# Patient Record
Sex: FEMALE | Race: WHITE | Employment: UNEMPLOYED | URBAN - NONMETROPOLITAN AREA
[De-identification: names, ages, dates, MRNs, and addresses within clinical notes are randomized per-mention and may not be internally consistent; named-entity substitution may affect disease eponyms.]

---
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
## 2021-02-18 ENCOUNTER — INITIAL CONSULT (OUTPATIENT)
Dept: PULMONOLOGY | Age: 51
End: 2021-02-18
Payer: MEDICARE

## 2021-02-18 VITALS
DIASTOLIC BLOOD PRESSURE: 72 MMHG | WEIGHT: 233.4 LBS | TEMPERATURE: 97.5 F | BODY MASS INDEX: 41.36 KG/M2 | HEIGHT: 63 IN | SYSTOLIC BLOOD PRESSURE: 126 MMHG | OXYGEN SATURATION: 97 % | HEART RATE: 86 BPM

## 2021-02-18 DIAGNOSIS — G47.30 SLEEP APNEA, UNSPECIFIED TYPE: Primary | ICD-10-CM

## 2021-02-18 DIAGNOSIS — R06.81 APNEA: ICD-10-CM

## 2021-02-18 DIAGNOSIS — F32.A DEPRESSION, UNSPECIFIED DEPRESSION TYPE: ICD-10-CM

## 2021-02-18 DIAGNOSIS — R06.83 SNORING: ICD-10-CM

## 2021-02-18 PROCEDURE — 99204 OFFICE O/P NEW MOD 45 MIN: CPT | Performed by: INTERNAL MEDICINE

## 2021-02-18 RX ORDER — LAMOTRIGINE 100 MG/1
100 TABLET ORAL DAILY
COMMUNITY

## 2021-02-18 RX ORDER — PANTOPRAZOLE SODIUM 40 MG/1
40 TABLET, DELAYED RELEASE ORAL DAILY
COMMUNITY

## 2021-02-18 RX ORDER — TRAZODONE HYDROCHLORIDE 150 MG/1
150 TABLET ORAL NIGHTLY
COMMUNITY

## 2021-02-18 RX ORDER — MAGNESIUM OXIDE 400 MG/1
400 TABLET ORAL DAILY
COMMUNITY

## 2021-02-18 RX ORDER — LINACLOTIDE 290 UG/1
290 CAPSULE, GELATIN COATED ORAL
COMMUNITY
End: 2022-07-05

## 2021-02-18 RX ORDER — GEMFIBROZIL 600 MG/1
600 TABLET, FILM COATED ORAL
COMMUNITY

## 2021-02-18 RX ORDER — M-VIT,TX,IRON,MINS/CALC/FOLIC 27MG-0.4MG
1 TABLET ORAL DAILY
COMMUNITY

## 2021-02-18 RX ORDER — SPIRONOLACTONE 25 MG/1
25 TABLET ORAL DAILY
COMMUNITY

## 2021-02-18 RX ORDER — ERENUMAB-AOOE 140 MG/ML
INJECTION, SOLUTION SUBCUTANEOUS
COMMUNITY
End: 2022-07-05

## 2021-02-18 RX ORDER — MONTELUKAST SODIUM 10 MG/1
10 TABLET ORAL NIGHTLY
COMMUNITY

## 2021-02-25 ENCOUNTER — TELEPHONE (OUTPATIENT)
Dept: PULMONOLOGY | Age: 51
End: 2021-02-25

## 2021-02-25 NOTE — TELEPHONE ENCOUNTER
Called patient unable to leave message,Pt needs follow up after PSG that is currently scheduled for 3/4/21 [facility]

## 2021-03-01 DIAGNOSIS — R06.81 APNEA: ICD-10-CM

## 2021-03-01 DIAGNOSIS — G47.30 SLEEP APNEA, UNSPECIFIED TYPE: ICD-10-CM

## 2021-03-01 DIAGNOSIS — F32.A DEPRESSION, UNSPECIFIED DEPRESSION TYPE: ICD-10-CM

## 2021-03-01 DIAGNOSIS — R06.83 SNORING: ICD-10-CM

## 2021-03-12 ENCOUNTER — VIRTUAL VISIT (OUTPATIENT)
Dept: PULMONOLOGY | Age: 51
End: 2021-03-12
Payer: MEDICARE

## 2021-03-12 DIAGNOSIS — R06.83 SNORING: ICD-10-CM

## 2021-03-12 DIAGNOSIS — G47.33 OBSTRUCTIVE SLEEP APNEA: Primary | ICD-10-CM

## 2021-03-12 DIAGNOSIS — E66.01 MORBID OBESITY WITH BMI OF 40.0-44.9, ADULT (HCC): ICD-10-CM

## 2021-03-12 PROCEDURE — 99213 OFFICE O/P EST LOW 20 MIN: CPT | Performed by: PHYSICIAN ASSISTANT

## 2021-03-12 ASSESSMENT — ENCOUNTER SYMPTOMS
DIARRHEA: 0
WHEEZING: 0
NAUSEA: 0
EYES NEGATIVE: 1
STRIDOR: 0
COUGH: 0
SHORTNESS OF BREATH: 0
BACK PAIN: 0
CHEST TIGHTNESS: 0
ALLERGIC/IMMUNOLOGIC NEGATIVE: 1

## 2021-03-12 NOTE — PROGRESS NOTES
[facility]    Tim Tolbert, 52 y.o.  [id number]        Nurses Notes   Follow up PSG  Study Results  Initial Study Date -    AHI -  6.9    TotalEvents - 44  (Apneas  0  Hypopneas 44  Central  0)  LM w/Arousals - 0  Sleep Efficiency - 96.8 % (Total Sleep Time - 382 min)  Time with Sats below 88% - 3.3 min  Interval History       Tim Tolbert is a 52 y.o. old female who comes in to review the results of her recent sleep study, to answer questions and to explore options for treatment. she continues to have symptoms of snoring. She was tested several years ago for SABRA and was started on PAP. She stopped using several years ago. She is in the bariatric program and was referred for re-evaluation. She is not planning on bariatric surgery, she already had gastric surgery and regained the weight. She is not tired during the day. She sleeps in a recliner.      PMHx  Past Medical History:   Diagnosis Date    Asthma     Constipation     Depression     Gallbladder disease     Gas     Hyperlipidemia     Migraines     Other specified disorders of thyroid     Sleep apnea      Past Surgical History:   Procedure Laterality Date    ADENOIDECTOMY AND TYMPANOSTOMY TUBE PLACEMENT      ANKLE SURGERY      APPENDECTOMY      CHOLECYSTECTOMY      GASTRIC BYPASS SURGERY  2012    PARTIAL HYSTERECTOMY  2007    SALPINGO-OOPHORECTOMY      SHOULDER SURGERY Right 2019    SHOULDER SURGERY Right 2019    SINUS SURGERY  2019    STOMACH SURGERY  2017    UPPER GASTROINTESTINAL ENDOSCOPY  2012     Social History     Tobacco Use    Smoking status: Former Smoker     Quit date:      Years since quittin.2    Smokeless tobacco: Never Used   Substance Use Topics    Alcohol use: No    Drug use: No     Family History   Problem Relation Age of Onset    Colon Cancer Neg Hx     Esophageal Cancer Neg Hx     Rectal Cancer Neg Hx     Stomach Cancer Neg Hx Allergies  Allergies   Allergen Reactions    Augmentin [Amoxicillin-Pot Clavulanate]     Duricef [Cefadroxil]     Imitrex [Sumatriptan]     Nsaids      Meds  Current Outpatient Medications   Medication Sig Dispense Refill    pantoprazole (PROTONIX) 40 MG tablet Take 40 mg by mouth daily      Multiple Vitamins-Minerals (THERAPEUTIC MULTIVITAMIN-MINERALS) tablet Take 1 tablet by mouth daily      montelukast (SINGULAIR) 10 MG tablet Take 10 mg by mouth nightly      lamoTRIgine (LAMICTAL) 100 MG tablet Take 100 mg by mouth daily      traZODone (DESYREL) 150 MG tablet Take 150 mg by mouth nightly      magnesium oxide (MAG-OX) 400 MG tablet Take 400 mg by mouth daily      spironolactone (ALDACTONE) 25 MG tablet Take 25 mg by mouth daily      gemfibrozil (LOPID) 600 MG tablet Take 600 mg by mouth 2 times daily (before meals)      Erenumab-aooe (AIMOVIG) 140 MG/ML SOAJ Inject into the skin      linaclotide (LINZESS) 290 MCG CAPS capsule Take 290 mcg by mouth every morning (before breakfast)      celecoxib (CELEBREX) 200 MG capsule Take 200 mg by mouth 2 times daily      metoprolol succinate (TOPROL XL) 25 MG extended release tablet Take 50 mg by mouth 2 times daily       topiramate (TOPAMAX) 100 MG tablet Take 150 mg by mouth daily       Topiramate  MG CS24 Take 150 mg by mouth nightly      rizatriptan (MAXALT) 10 MG tablet Take 10 mg by mouth as needed for Migraine May repeat in 2 hours if needed      promethazine (PHENERGAN) 25 MG tablet Take 25 mg by mouth every 6 hours as needed for Nausea      cycloSPORINE (RESTASIS OP) Apply to eye as needed      OnabotulinumtoxinA (BOTOX IJ) Inject as directed every 3 months      amitriptyline (ELAVIL) 50 MG tablet Take 50 mg by mouth nightly      amitriptyline (ELAVIL) 100 MG tablet Take 50 mg by mouth nightly Indications: 50 mg morning    100 mg night       zolpidem (AMBIEN) 10 MG tablet Take 10 mg by mouth nightly as needed for Sleep      levothyroxine (SYNTHROID) 200 MCG tablet Take 225 mcg by mouth Daily       omeprazole (PRILOSEC) 40 MG delayed release capsule Take 40 mg by mouth daily      clonazePAM (KLONOPIN) 0.5 MG tablet Take 1 mg by mouth 2 times daily as needed.  risperiDONE (RISPERDAL) 4 MG tablet Take 4 mg by mouth nightly       No current facility-administered medications for this visit. ROS  Review of Systems   Constitutional: Negative for activity change, appetite change, chills and fever. HENT: Negative for congestion and postnasal drip. Eyes: Negative. Respiratory: Negative for cough, chest tightness, shortness of breath, wheezing and stridor. Cardiovascular: Negative for chest pain and leg swelling. Gastrointestinal: Negative for diarrhea and nausea. Endocrine: Negative. Genitourinary: Negative. Musculoskeletal: Negative. Negative for arthralgias and back pain. Skin: Negative. Allergic/Immunologic: Negative. Neurological: Negative. Negative for dizziness and light-headedness. Psychiatric/Behavioral: Negative. All other systems reviewed and are negative. Exam  Vitals -  There were no vitals taken for this visit. There is no height or weight on file to calculate BMI. Oxygen level - Room Air  Physical Exam  Constitutional:       Appearance: Normal appearance. She is normal weight. HENT:      Head: Normocephalic and atraumatic. Right Ear: External ear normal.      Left Ear: External ear normal.      Nose: Nose normal.   Eyes:      Extraocular Movements: Extraocular movements intact. Conjunctiva/sclera: Conjunctivae normal.      Pupils: Pupils are equal, round, and reactive to light. Neck:      Musculoskeletal: Normal range of motion and neck supple. Pulmonary:      Effort: Pulmonary effort is normal.   Neurological:      General: No focal deficit present. Mental Status: She is alert and oriented to person, place, and time.    Psychiatric:         Attention and Perception: Attention and perception normal.         Mood and Affect: Mood and affect normal.         Speech: Speech normal.         Behavior: Behavior normal. Behavior is cooperative. Thought Content: Thought content normal.         Cognition and Memory: Cognition normal.         Judgment: Judgment normal.        Assessment   Diagnosis Orders   1. Obstructive sleep apnea     2. Morbid obesity with BMI of 40.0-44.9, adult ([facility] [address]     3.  Snoring        Recommendations  PSG positive for mild sleep apnea  Her AHI is 50% improved from 2009  She has not tolerated CPAP twice in the past secondary to claustrophobia  Discussed positional therapy and weight loss as most appropriate treatment for her  Weight loss will improve the snoring  If she is planning bariatric surgery revision, then would require PAP therapy  Instructed to work on weight loss   Follow up as needed if weight increases or symptoms worsen            Laura Zuniga PA-C, RINAS  3/12/2021